# Patient Record
Sex: FEMALE | Race: WHITE | NOT HISPANIC OR LATINO | URBAN - METROPOLITAN AREA
[De-identification: names, ages, dates, MRNs, and addresses within clinical notes are randomized per-mention and may not be internally consistent; named-entity substitution may affect disease eponyms.]

---

## 2018-07-10 LAB — HBA1C MFR BLD HPLC: 4.5 %

## 2019-02-12 ENCOUNTER — TELEPHONE (OUTPATIENT)
Dept: GASTROENTEROLOGY | Facility: CLINIC | Age: 64
End: 2019-02-12

## 2019-02-13 NOTE — TELEPHONE ENCOUNTER
Hard copy MRI result printed from [facility]  Placed on 's desk to review/advise  Please return hard copy to Nursing so we can get scanned

## 2019-02-14 NOTE — TELEPHONE ENCOUNTER
Left message on answering machine  MRCP normal post cholecystectomy  Needs to get blood work done that I had ordered  Follow up in office

## 2019-06-14 ENCOUNTER — TELEPHONE (OUTPATIENT)
Dept: GASTROENTEROLOGY | Facility: CLINIC | Age: 64
End: 2019-06-14

## 2019-06-14 DIAGNOSIS — R94.5 NONSPECIFIC ABNORMAL RESULTS OF LIVER FUNCTION STUDY: Primary | ICD-10-CM

## 2019-06-18 ENCOUNTER — TELEPHONE (OUTPATIENT)
Dept: GASTROENTEROLOGY | Facility: CLINIC | Age: 64
End: 2019-06-18

## 2019-06-18 LAB
A2 MACROGLOB SERPL-MCNC: 274 MG/DL (ref 110–276)
ALT SERPL W P-5'-P-CCNC: 41 IU/L (ref 0–40)
APO A-I SERPL-MCNC: 150 MG/DL (ref 116–209)
BILIRUB SERPL-MCNC: 0.6 MG/DL (ref 0–1.2)
COMMENT: ABNORMAL
FIBROSIS SCORING:: ABNORMAL
FIBROSIS STAGE SERPL QL: ABNORMAL
GGT SERPL-CCNC: 299 IU/L (ref 0–60)
HAPTOGLOB SERPL-MCNC: 124 MG/DL (ref 34–200)
HAV AB SER QL IA: NEGATIVE
HBV SURFACE AB SER-ACNC: <3.1 MIU/ML
HBV SURFACE AG SERPL QL IA: NEGATIVE
HCV AB S/CO SERPL IA: <0.1 S/CO RATIO (ref 0–0.9)
INTERPRETATIONS: ABNORMAL
LIVER FIBR SCORE SERPL CALC.FIBROSURE: 0.63 (ref 0–0.21)
NECROINFLAMM ACTIVITY SCORING:: ABNORMAL
NECROINFLAMMATORY ACT GRADE SERPL QL: ABNORMAL
NECROINFLAMMATORY ACT SCORE SERPL: 0.32 (ref 0–0.17)
SERVICE CMNT-IMP: ABNORMAL

## 2019-06-24 ENCOUNTER — TELEPHONE (OUTPATIENT)
Dept: GASTROENTEROLOGY | Facility: CLINIC | Age: 64
End: 2019-06-24

## 2019-06-24 NOTE — TELEPHONE ENCOUNTER
Per tel enc , Dr Iva Mckinney wants patient seen this summer, no available openings at this time, patient only wants to see him and issists she was supposed to have an office visit in July     Please call patient with a cancellation

## 2022-03-11 ENCOUNTER — TELEPHONE (OUTPATIENT)
Dept: GASTROENTEROLOGY | Facility: CLINIC | Age: 67
End: 2022-03-11

## 2022-03-11 NOTE — TELEPHONE ENCOUNTER
Discussed with Dr Tracy Rivera  She has not seen the patient for 2 years  She reached out to her because her son was diagnosed with latent tuberculosis and the patient had a cough and was concerned  Blood work revealed a total bilirubin of 7 a but TB test were negative  Reviewing all blood work her bilirubin was always normal despite having mild increased LFTs thought related to alcohol intake  Dr Jessica Nolan is going to reach out to the patient and if there is any concerning symptoms refer her to the emergency room  If not then will need to see her in follow-up in the office    If patient was not admitted over the weekend please call her and set her up for an office visit with any doctor or nurse practitioner  Get blood work from [facility]    Thank you

## 2022-03-14 NOTE — TELEPHONE ENCOUNTER
Labs printed from [facility] and sent for scanning  Routing to  to have them contact patient and schedule appt as per Dr James Diez

## 2022-03-14 NOTE — TELEPHONE ENCOUNTER
Pt not admitted to [facility]  [facility] or [facility]

## 2022-03-18 NOTE — TELEPHONE ENCOUNTER
rec'd opening with herm Monday 3/21  I scheduled the patient, but left a voicemail for her to please call us back asap so we can confirm this

## 2022-03-18 NOTE — TELEPHONE ENCOUNTER
Pt admitted to [facility]  Herm called and wanted to see if we could get an appointment in next week with him sometime  Please keep an eye out on his schedule if there becomes an opening   Thank you

## 2022-03-21 ENCOUNTER — OFFICE VISIT (OUTPATIENT)
Dept: GASTROENTEROLOGY | Facility: CLINIC | Age: 67
End: 2022-03-21
Payer: COMMERCIAL

## 2022-03-21 VITALS
SYSTOLIC BLOOD PRESSURE: 118 MMHG | HEART RATE: 72 BPM | WEIGHT: 128 LBS | DIASTOLIC BLOOD PRESSURE: 68 MMHG | HEIGHT: 63 IN | BODY MASS INDEX: 22.68 KG/M2

## 2022-03-21 DIAGNOSIS — R74.8 ELEVATED LIVER ENZYMES: ICD-10-CM

## 2022-03-21 DIAGNOSIS — K70.31 ASCITES DUE TO ALCOHOLIC CIRRHOSIS (HCC): Primary | ICD-10-CM

## 2022-03-21 DIAGNOSIS — Z86.010 HISTORY OF COLON POLYPS: ICD-10-CM

## 2022-03-21 PROCEDURE — 99214 OFFICE O/P EST MOD 30 MIN: CPT | Performed by: NURSE PRACTITIONER

## 2022-03-21 RX ORDER — FAMOTIDINE 20 MG/1
20 TABLET, FILM COATED ORAL 2 TIMES DAILY PRN
Qty: 60 TABLET | Refills: 4 | Status: SHIPPED | OUTPATIENT
Start: 2022-03-21

## 2022-03-21 RX ORDER — LACTULOSE 20 G/30ML
10 SOLUTION ORAL 2 TIMES DAILY
Qty: 473 ML | Refills: 6 | Status: SHIPPED | OUTPATIENT
Start: 2022-03-21

## 2022-03-22 DIAGNOSIS — Z86.010 HISTORY OF COLON POLYPS: Primary | ICD-10-CM

## 2022-03-22 NOTE — TELEPHONE ENCOUNTER
Scheduled date of colonoscopy/EGD (as of today):4/27/22   Physician performing colonoscopy:Dr Willy Marina  Location of colonoscopy:[facility]  Bowel prep reviewed with patient:Petr  Instructions reviewed with patient by: Pankaj Moore  Clearances: No

## 2022-03-28 ENCOUNTER — TELEPHONE (OUTPATIENT)
Dept: GASTROENTEROLOGY | Facility: CLINIC | Age: 67
End: 2022-03-28

## 2022-03-28 NOTE — TELEPHONE ENCOUNTER
Spoke to patient's  in regards to update for patient  He did have a question regarding follow-up lab work and ultrasound  He will have that done at [facility]  She is doing well on her Breeze drink protein supplements  He is concerned about her increased sleeping during the day and is awake in the evening  Did mention to him frequent small meals and keeping her stimulated keeping her out of bed in the chair and moving around  As the weather gets nicer hopefully she can sit outside  She is doing very well as far as not requiring paracentesis  Follow-up appointment is not currently scheduled until June  I did relay that I can forward information to scheduling staff that if a cancellation arrives in May can possibly reschedule up from June

## 2022-03-28 NOTE — TELEPHONE ENCOUNTER
Richi young has some concerns he would like to discuss with Herm  Questions about when to do labs, concerns about Jordin dillon not getting out of bed and perhaps needing some intervention for that, questions about nutrition  Requesting call back

## 2022-03-28 NOTE — TELEPHONE ENCOUNTER
Pt's , Yamileth Mendes, left  mssg stating he got a text and ding but did not hear the phone ring; has concerns/wants to spk w/ someone  CB# [phone]

## 2022-03-28 NOTE — TELEPHONE ENCOUNTER
Pt's , Dexter Parents, states they saw Herm last Mon after hospitalization; has ques for nurse or Herm  # [phone]

## 2022-04-14 ENCOUNTER — TELEPHONE (OUTPATIENT)
Dept: GASTROENTEROLOGY | Facility: CLINIC | Age: 67
End: 2022-04-14

## 2022-04-14 NOTE — TELEPHONE ENCOUNTER
Herm-pt's  Molina Levya called to tell you that pt passed away   I cancelled the combo that was scheduled and the f/u appt scheduled with you in June

## 2023-08-30 NOTE — PROGRESS NOTES
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 [facility])  Patient being seen for follow-up [facility] admission for new diagnosis of alcohol cirrhosis with ascites  Paracentesis x1 to date for 4 6 L  Negative SBP  Patient states she had been drinking 1-3 gin and tonic for a long time  States she has not had any alcohol for at least 1 month  Information is verified by a very supportive   Patient states she is having very small hard daily bowel movements  Added lactulose to her med regimen  She was prescribed 50 mg Aldactone daily  Blood pressure is low but stable at 118/68  Discussed with patient and her  2 g sodium diet and frequent small meals in order to get appropriate nutrition  Patient denies any overt GI bleeding  She is due for recall colonoscopy and will also schedule EGD for varices surveillance  Standing orders for as needed paracentesis have been initiated at [facility]  Ultrasound also revealed nodular liver surface  Patient's abdomen is distended however very soft  Patient is instructed to utilize the emergency room if she becomes increased shortness of breath, chest pain, palpitations due to increased pain or ascites to her abdomen  - schedule EGD at [facility])  - frequent small meals  - 2 g sodium diet  - 1800 mL fluid restriction  - US guidance; Future  - lactulose 20 g/30 mL; Take 15 mL (10 g total) by mouth 2 (two) times a day  Dispense: 473 mL; Refill: 6  - CBC and differential; Future  - Protime-INR  - Comprehensive metabolic panel  - US right upper quadrant; Future  - famotidine (PEPCID) 20 mg tablet; Take 1 tablet (20 mg total) by mouth 2 (two) times a day as needed for heartburn  Dispense: 60 tablet; Refill: 4  - CBC and differential  - Anti-smooth muscle antibody, IgG; Future  - Antimitochondrial antibody;  Future  - VSS. Denies pain. Medical/surgical history and medications reviewed. No distress noted. Procedural education completed with patient's questions addressed.     Anti-smooth muscle antibody, IgG  - Antimitochondrial antibody  - AFP tumor marker; Future  - AFP tumor marker    2  Elevated liver enzymes  Recent admission to [facility] for alcohol related cirrhosis with ascites, noted increased LFTs  Upon discharge, T bili 4 7, AST 99, ALT 45, alk-phos 83  RUQ U/S; nodular surface, 2 7 cm cyst right lower pole, moderate ascites  Negative hepatitis panel, negative CARLEY  Monitor liver enzymes  Likely EtOH related, however will evaluate for autoimmune disorder       - Anti-smooth muscle antibody, IgG; Future  - Antimitochondrial antibody; Future  - Anti-smooth muscle antibody, IgG  - Antimitochondrial antibody  - Comprehensive metabolic panel  - US right upper quadrant; Future    3  History of colon polyps  Colonoscopy 2018; 3 year recall  - schedule colonoscopy at [facility])      Followup Appointment:  2 months, after EGD/colonoscopy  ______________________________________________________________________    Chief Complaint   Patient presents with   [facility] follow up-cirrhosis     HPI:  62-year-old female with history of cholecystectomy, hypertension presents with follow-up hospital admission at [facility] (3/15-3/18) for alcohol cirrhosis with ascites and elevated liver enzymes  Patient was admitted originally with jaundice and distended abdomen  She states she had been drinking approximately 1-3 gin and tonic for quite some time  She states she has not had any alcohol for at least a month now  Patient is accompanied by her  who is extremely supportive  She also has a son who is a registered nurse and 2 physicians in the family  Patient originally had a paracentesis for 4 6 L and was negative for SBP  Upon discharge her T bili was 4 7, AST 99, ALT 45, and alk-phos 83, INR 2 44, hepatitis panel negative, CARLEY negative, hemoglobin 10 5, WBCs 6 6, platelets 619  In reviewing patient's EMR, she also had a hemochromatosis DNA negative (2018)    Patient had ultrasound of the abdomen done on 03/16; nodular surface, 2 7 cm cyst on right lower pole, moderate ascites  Patient was placed on a 2 g sodium diet and an 1800 mL fluid restriction  Patient denies having trouble swallowing, denies nausea or abdominal pain  She did have an episode of acid reflux which she states felt like burning in her esophagus and 1 episode of vomiting at that time  She had been taking Nexium 40 mg daily as needed  Patient had a colonoscopy 11/08/2018; polyps, hemorrhoids 3 year recall  Also EGD 11/08/2018 was normal   Nonsmoker, last alcohol x1 month  Historical Information   Past Medical History:   Diagnosis Date    Alcoholism ([address] )     Hypertension     Liver disease      Past Surgical History:   Procedure Laterality Date    CHOLECYSTECTOMY       Social History     Substance and Sexual Activity   Alcohol Use Not Currently    Alcohol/week: 0 0 standard drinks     Social History     Substance and Sexual Activity   Drug Use Never     Social History     Tobacco Use   Smoking Status Never Smoker   Smokeless Tobacco Never Used     Family History   Problem Relation Age of Onset    Colon cancer Father     Colon polyps Neg Hx          Current Outpatient Medications:     famotidine (PEPCID) 20 mg tablet    lactulose 20 g/30 mL  No Known Allergies  Reviewed medications and allergies and updated as indicated    PHYSICAL EXAM:    Blood pressure 118/68, pulse 72, height 5' 3" (1 6 m), weight 58 1 kg (128 lb)  Body mass index is 22 67 kg/m²  General Appearance: NAD, cooperative, alert, pallor  Eyes: Anicteric, PERRLA, EOMI  ENT:  Normocephalic, atraumatic, normal mucosa  Neck:  Supple, symmetrical, trachea midline  Resp:  Clear to auscultation bilaterally; no rales, rhonchi or wheezing; respirations unlabored   CV:  S1 S2, Regular rate and rhythm; no murmur, rub, or gallop    GI:   large, Soft, non-tender, non-distended; normal bowel sounds; no masses, no organomegaly   Rectal: Deferred  Musculoskeletal: No cyanosis, clubbing or edema  Normal ROM  Skin:  No jaundice, rashes, or lesions   Heme/Lymph: No palpable cervical lymphadenopathy  Psych: Normal affect, good eye contact  Neuro: No gross deficits, AAOx3    Lab Results:   No results found for: WBC, HGB, HCT, MCV, PLT  Lab Results   Component Value Date    ALT 41 (H) 06/14/2019     No results found for: IRON, TIBC, FERRITIN  No results found for: LIPASE    Radiology Results:   No results found